# Patient Record
Sex: MALE | Race: WHITE
[De-identification: names, ages, dates, MRNs, and addresses within clinical notes are randomized per-mention and may not be internally consistent; named-entity substitution may affect disease eponyms.]

---

## 2019-09-30 ENCOUNTER — HOSPITAL ENCOUNTER (EMERGENCY)
Dept: HOSPITAL 56 - MW.ED | Age: 29
Discharge: HOME | End: 2019-09-30
Payer: COMMERCIAL

## 2019-09-30 VITALS — SYSTOLIC BLOOD PRESSURE: 132 MMHG | HEART RATE: 90 BPM | DIASTOLIC BLOOD PRESSURE: 84 MMHG

## 2019-09-30 DIAGNOSIS — K21.9: Primary | ICD-10-CM

## 2019-09-30 DIAGNOSIS — Z95.0: ICD-10-CM

## 2019-09-30 LAB
BUN SERPL-MCNC: 8 MG/DL (ref 7–18)
CHLORIDE SERPL-SCNC: 101 MMOL/L (ref 98–107)
CO2 SERPL-SCNC: 29.2 MMOL/L (ref 21–32)
GLUCOSE SERPL-MCNC: 102 MG/DL (ref 74–106)
POTASSIUM SERPL-SCNC: 3.8 MMOL/L (ref 3.5–5.1)
SODIUM SERPL-SCNC: 140 MMOL/L (ref 136–148)

## 2019-09-30 PROCEDURE — 80053 COMPREHEN METABOLIC PANEL: CPT

## 2019-09-30 PROCEDURE — 81003 URINALYSIS AUTO W/O SCOPE: CPT

## 2019-09-30 PROCEDURE — 99285 EMERGENCY DEPT VISIT HI MDM: CPT

## 2019-09-30 PROCEDURE — 36415 COLL VENOUS BLD VENIPUNCTURE: CPT

## 2019-09-30 PROCEDURE — 71045 X-RAY EXAM CHEST 1 VIEW: CPT

## 2019-09-30 PROCEDURE — 84484 ASSAY OF TROPONIN QUANT: CPT

## 2019-09-30 PROCEDURE — 85025 COMPLETE CBC W/AUTO DIFF WBC: CPT

## 2019-09-30 PROCEDURE — 93005 ELECTROCARDIOGRAM TRACING: CPT

## 2019-09-30 NOTE — CR
Chest: Portable view of the chest was obtained.



Comparison: Prior chest x-ray of 11/17/15.



Heart size and mediastinum are normal.  Pacemaker is noted.  Lungs are clear 
with no acute parenchymal change.  Bony structures are grossly intact.



Impression:  Nothing acute is seen on portable chest x-ray.



Diagnostic code #2





MTDD

## 2019-09-30 NOTE — EDM.PDOC
ED HPI GENERAL MEDICAL PROBLEM





- General


Chief Complaint: Chest Pain


Stated Complaint: CHEST PAIN AND SHORTNESS OF BREATH


Time Seen by Provider: 09/30/19 14:53


Source of Information: Reports: Patient





- History of Present Illness


INITIAL COMMENTS - FREE TEXT/NARRATIVE: 





HISTORY AND PHYSICAL:


History of present illness:


pt presents with chest pain and shortness of breath





He speaks in full sentences no distress no purse lipped rebreathing retraction 

lungs are clear





He has a history of vasovagal syncope apparently due to bradycardia as he has 

had a pacemaker placed for this through mail





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 

been over 6 hours since this event it resolved spontaneously





 does have some epigastric pain that is relieved by GI cocktail and history of 

GERD in the past 





Currently no fever nausea vomiting chills sweats no chest pain at current no 

shortness of breath headache dizziness or palpitation no bowel or urine symptoms








Review of systems: 


As per history of present illness and below otherwise all systems reviewed and 

negative.


Past medical history: 


As per history of present illness and as reviewed below otherwise 

noncontributory.


Surgical history: 


As per history of present illness and as reviewed below otherwise 

noncontributory.


Social history: 


No reported history of drug or alcohol abuse.


Family history: 


As per history of present illness and as reviewed below otherwise 

noncontributory.


Physical exam:


HEENT: Atraumatic, normocephalic, pupils reactive, negative for conjunctival 

pallor or scleral icterus, mucous membranes moist, throat clear, neck supple, 

nontender, trachea midline.


Lungs: Clear to auscultation, breath sounds equal bilaterally, chest nontender.


Heart: S1S2, regular, negative for clicks, rubs, or JVD.


Abdomen: Soft, nondistended, nontender. Negative for masses or 

hepatosplenomegaly. Negative for costovertebral tenderness.


Pelvis: Stable nontender.


Genitourinary: Deferred.


Rectal: Deferred.


Extremities: Atraumatic, negative for cords or calf pain. Neurovascular 

unremarkable.


Neuro: Awake, alert, oriented. Cranial nerves II through XII unremarkable. 

Cerebellum unremarkable. Motor and sensory unremarkable throughout. Exam 

nonfocal.


Diagnostics:


[MPV UA lipase troponin


EKG


Chest 1 view


]


Therapeutics:


[ GI cocktail





Recommend Zantac/omeprazole avoidance of triggers for acid reflux and his case 

seems to be [employer] and spicy food


Did offer the patient admission for observation however he refused


]


Impression: 


[ GERD


Chronic history of baseline ]


Definitive disposition and diagnosis as appropriate pending reevaluation and 

review of above.


  ** chest


Pain Score (Numeric/FACES): 1





- Related Data


 Allergies











Allergy/AdvReac Type Severity Reaction Status Date / Time


 


No Known Allergies Allergy   Verified 11/17/15 04:20











Home Meds: 


 Home Meds





. [No Known Home Meds]  09/11/14 [History]











Past Medical History


Cardiovascular History: Reports: Pacemaker


Other Cardiovascular History: vasovagal syncope





- Infectious Disease History


Infectious Disease History: Reports: Chicken Pox





- Past Surgical History


Other Cardiovascular Surgeries/Procedures: pacemaker (Biotornic and medtronic)


Other Musculoskeletal Surgeries/Procedures:: extra finger surgery





Social & Family History





- Family History


Family Medical History: Noncontributory





- Tobacco Use


Smoking Status *Q: Never Smoker





- Recreational Drug Use


Recreational Drug Use: No





ED ROS GENERAL





- Review of Systems


Review Of Systems: See Below





ED EXAM, GENERAL





- Physical Exam


Exam: See Below





Course





- Vital Signs


Last Recorded V/S: 


 Last Vital Signs











Temp  96.8 F   09/30/19 13:17


 


Pulse  90   09/30/19 13:17


 


Resp      


 


BP  132/84   09/30/19 13:17


 


Pulse Ox  97   09/30/19 13:17








 





Orthostatic Blood Pressure [     121/84


Standing]                        


Orthostatic Blood Pressure [     124/82


Sitting]                         


Orthostatic Blood Pressure [     119/81


Supine]                          











- Orders/Labs/Meds


Orders: 


 Active Orders 24 hr











 Category Date Time Status


 


 EKG Documentation Completion [RC] STAT Care  09/30/19 13:16 Active


 


 Orthostatic Vital Signs [RC] ASDIRECTED Care  09/30/19 13:22 Active











Labs: 


 Laboratory Tests











  09/30/19 09/30/19 09/30/19 Range/Units





  13:30 13:37 13:37 


 


WBC   7.81   (4.0-11.0)  K/uL


 


RBC   5.02   (4.50-5.90)  M/uL


 


Hgb   15.0   (13.0-17.0)  g/dL


 


Hct   43.3   (38.0-50.0)  %


 


MCV   86.3   (80.0-98.0)  fL


 


MCH   29.9   (27.0-32.0)  pg


 


MCHC   34.6   (31.0-37.0)  g/dL


 


RDW Std Deviation   40.9   (28.0-62.0)  fl


 


RDW Coeff of Jeffy   13   (11.0-15.0)  %


 


Plt Count   292   (150-400)  K/uL


 


MPV   8.40   (7.40-12.00)  fL


 


Neut % (Auto)   65.1   (48.0-80.0)  %


 


Lymph % (Auto)   26.2   (16.0-40.0)  %


 


Mono % (Auto)   5.1   (0.0-15.0)  %


 


Eos % (Auto)   3.5   (0.0-7.0)  %


 


Baso % (Auto)   0.1   (0.0-1.5)  %


 


Neut # (Auto)   5.1   (1.4-5.7)  K/uL


 


Lymph # (Auto)   2.1   (0.6-2.4)  K/uL


 


Mono # (Auto)   0.4   (0.0-0.8)  K/uL


 


Eos # (Auto)   0.3   (0.0-0.7)  K/uL


 


Baso # (Auto)   0.0   (0.0-0.1)  K/uL


 


Nucleated RBC %   0.0   /100WBC


 


Nucleated RBCs #   0   K/uL


 


Sodium    140  (136-148)  mmol/L


 


Potassium    3.8  (3.5-5.1)  mmol/L


 


Chloride    101  ()  mmol/L


 


Carbon Dioxide    29.2  (21.0-32.0)  mmol/L


 


BUN    8  (7.0-18.0)  mg/dL


 


Creatinine    0.9  (0.8-1.3)  mg/dL


 


Est Cr Clr Drug Dosing    142.14  mL/min


 


Estimated GFR (MDRD)    > 60.0  ml/min


 


Glucose    102  ()  mg/dL


 


Calcium    9.6  (8.5-10.1)  mg/dL


 


Total Bilirubin    0.4  (0.2-1.0)  mg/dL


 


AST    17  (15-37)  IU/L


 


ALT    26  (14-63)  IU/L


 


Alkaline Phosphatase    80  ()  U/L


 


Troponin I    < 0.050  (0.000-0.056)  ng/mL


 


Total Protein    8.6 H  (6.4-8.2)  g/dL


 


Albumin    4.7  (3.4-5.0)  g/dL


 


Globulin    3.9  (2.6-4.0)  g/dL


 


Albumin/Globulin Ratio    1.2  (0.9-1.6)  


 


Urine Color  YELLOW    


 


Urine Appearance  CLEAR    


 


Urine pH  6.5    (5.0-8.0)  


 


Ur Specific Gravity  <= 1.005    (1.001-1.035)  


 


Urine Protein  NEGATIVE    (NEGATIVE)  mg/dL


 


Urine Glucose (UA)  NEGATIVE    (NEGATIVE)  mg/dL


 


Urine Ketones  NEGATIVE    (NEGATIVE)  mg/dL


 


Urine Occult Blood  NEGATIVE    (NEGATIVE)  


 


Urine Nitrite  NEGATIVE    (NEGATIVE)  


 


Urine Bilirubin  NEGATIVE    (NEGATIVE)  


 


Urine Urobilinogen  0.2    (<2.0)  EU/dL


 


Ur Leukocyte Esterase  NEGATIVE    (NEGATIVE)  











Meds: 


Medications














Discontinued Medications














Generic Name Dose Route Start Last Admin





  Trade Name Freq  PRN Reason Stop Dose Admin


 


Al Hydroxide/Mg Hydroxide 15  0 ml  09/30/19 14:53  09/30/19 15:04





ml/ Metoclopramide HCl 5 mg/  PO  09/30/19 14:54  1 each





Lidocaine HCl 5 ml  ONETIME ONE   Administration





     





     





     





     














Departure





- Departure


Time of Disposition: 16:05


Disposition: Home, Self-Care 01


Condition: Good


Clinical Impression: 


 GERD (gastroesophageal reflux disease)








- Discharge Information


Referrals: 


PCP,Unknown [Primary Care Provider] - 


Forms:  ED Department Discharge


Additional Instructions: 


Consider Zantac 150 milligrams 2 times daily as needed


Return if symptoms persist or worsen or if new concerning symptoms develop


Follow-up with primary care in 2 weeks sooner as needed





[facility]


[address] [place] [address]


Phone: [phone]


Fax: [phone]





The following information is given to patients seen in the emergency department 

who are being discharged to home. This information is to outline your options 

for follow-up care. We provide all patients seen in our emergency department 

with a follow-up referral.





The need for follow-up, as well as the timing and circumstances, are variable 

depending upon the specifics of your emergency department visit.





If you don't have a primary care physician on staff, we will provide you with a 

referral. We always advise you to contact your personal physician following an 

emergency department visit to inform them of the circumstance of the visit and 

for follow-up with them and/or the need for any referrals to a consulting 

specialist.





The emergency department will also refer you to a specialist when appropriate. 

This referral assures that you have the opportunity for follow-up care with a 

specialist. All of these measure are taken in an effort to provide you with 

optimal care, which includes your follow-up.





Under all circumstances we always encourage you to contact your private 

physician who remains a resource for coordinating your care. When calling for 

follow-up care, please make the office aware that this follow-up is from your 

recent emergency room visit. If for any reason you are refused follow-up, 

please contact the [facility] emergency department at [phone] 

and asked to speak to the emergency department charge nurse.











- My Orders


Last 24 Hours: 


My Active Orders





09/30/19 13:16


EKG Documentation Completion [RC] STAT 





09/30/19 13:22


Orthostatic Vital Signs [RC] ASDIRECTED 














- Assessment/Plan


Last 24 Hours: 


My Active Orders





09/30/19 13:16


EKG Documentation Completion [RC] STAT 





09/30/19 13:22


Orthostatic Vital Signs [RC] ASDIRECTED